# Patient Record
Sex: FEMALE | Employment: PART TIME | URBAN - METROPOLITAN AREA
[De-identification: names, ages, dates, MRNs, and addresses within clinical notes are randomized per-mention and may not be internally consistent; named-entity substitution may affect disease eponyms.]

---

## 2017-09-27 ENCOUNTER — OFFICE VISIT - HEALTHEAST (OUTPATIENT)
Dept: FAMILY MEDICINE | Facility: CLINIC | Age: 27
End: 2017-09-27

## 2017-09-27 DIAGNOSIS — Z87.898 HISTORY OF SEIZURE: ICD-10-CM

## 2017-09-27 ASSESSMENT — MIFFLIN-ST. JEOR: SCORE: 1200.35

## 2017-09-27 NOTE — ASSESSMENT & PLAN NOTE
On June 18, 2015, while she was vacationing in [facility], has seen a neurologist, MRI and EEG were all negative.  No recurrence as of September 27, 2017

## 2018-10-31 ENCOUNTER — OFFICE VISIT - HEALTHEAST (OUTPATIENT)
Dept: FAMILY MEDICINE | Facility: CLINIC | Age: 28
End: 2018-10-31

## 2018-10-31 DIAGNOSIS — Z87.898 HISTORY OF SEIZURE: ICD-10-CM

## 2018-10-31 ASSESSMENT — MIFFLIN-ST. JEOR: SCORE: 1241.17

## 2019-10-04 ENCOUNTER — COMMUNICATION - HEALTHEAST (OUTPATIENT)
Dept: FAMILY MEDICINE | Facility: CLINIC | Age: 29
End: 2019-10-04

## 2020-08-25 ENCOUNTER — COMMUNICATION - HEALTHEAST (OUTPATIENT)
Dept: FAMILY MEDICINE | Facility: CLINIC | Age: 30
End: 2020-08-25

## 2020-08-27 ENCOUNTER — OFFICE VISIT - HEALTHEAST (OUTPATIENT)
Dept: FAMILY MEDICINE | Facility: CLINIC | Age: 30
End: 2020-08-27

## 2020-08-27 DIAGNOSIS — Z87.898 HISTORY OF SEIZURE: ICD-10-CM

## 2020-10-19 ENCOUNTER — OFFICE VISIT - HEALTHEAST (OUTPATIENT)
Dept: FAMILY MEDICINE | Facility: CLINIC | Age: 30
End: 2020-10-19

## 2020-10-19 DIAGNOSIS — Z12.4 SCREENING FOR MALIGNANT NEOPLASM OF CERVIX: ICD-10-CM

## 2020-10-19 DIAGNOSIS — Z13.0 SCREENING FOR ENDOCRINE, NUTRITIONAL, METABOLIC AND IMMUNITY DISORDER: ICD-10-CM

## 2020-10-19 DIAGNOSIS — Z13.228 SCREENING FOR ENDOCRINE, NUTRITIONAL, METABOLIC AND IMMUNITY DISORDER: ICD-10-CM

## 2020-10-19 DIAGNOSIS — Z00.00 VISIT FOR PREVENTIVE HEALTH EXAMINATION: ICD-10-CM

## 2020-10-19 DIAGNOSIS — Z23 NEED FOR VACCINATION: ICD-10-CM

## 2020-10-19 DIAGNOSIS — Z13.21 SCREENING FOR ENDOCRINE, NUTRITIONAL, METABOLIC AND IMMUNITY DISORDER: ICD-10-CM

## 2020-10-19 DIAGNOSIS — Z13.29 SCREENING FOR ENDOCRINE, NUTRITIONAL, METABOLIC AND IMMUNITY DISORDER: ICD-10-CM

## 2020-10-19 LAB
ALBUMIN SERPL-MCNC: 4.3 G/DL (ref 3.5–5)
ALP SERPL-CCNC: 81 U/L (ref 45–120)
ALT SERPL W P-5'-P-CCNC: <9 U/L (ref 0–45)
ANION GAP SERPL CALCULATED.3IONS-SCNC: 9 MMOL/L (ref 5–18)
AST SERPL W P-5'-P-CCNC: 16 U/L (ref 0–40)
BASOPHILS # BLD AUTO: 0.1 THOU/UL (ref 0–0.2)
BASOPHILS NFR BLD AUTO: 1 % (ref 0–2)
BILIRUB SERPL-MCNC: 0.6 MG/DL (ref 0–1)
BUN SERPL-MCNC: 14 MG/DL (ref 8–22)
CALCIUM SERPL-MCNC: 9.3 MG/DL (ref 8.5–10.5)
CHLORIDE BLD-SCNC: 105 MMOL/L (ref 98–107)
CHOLEST SERPL-MCNC: 215 MG/DL
CO2 SERPL-SCNC: 25 MMOL/L (ref 22–31)
CREAT SERPL-MCNC: 0.66 MG/DL (ref 0.6–1.1)
EOSINOPHIL # BLD AUTO: 0.1 THOU/UL (ref 0–0.4)
EOSINOPHIL NFR BLD AUTO: 1 % (ref 0–6)
ERYTHROCYTE [DISTWIDTH] IN BLOOD BY AUTOMATED COUNT: 12.1 % (ref 11–14.5)
FASTING STATUS PATIENT QL REPORTED: NO
GFR SERPL CREATININE-BSD FRML MDRD: >60 ML/MIN/1.73M2
GLUCOSE BLD-MCNC: 74 MG/DL (ref 70–125)
HCT VFR BLD AUTO: 44.9 % (ref 35–47)
HDLC SERPL-MCNC: 62 MG/DL
HGB BLD-MCNC: 14.8 G/DL (ref 12–16)
HIV 1+2 AB+HIV1 P24 AG SERPL QL IA: NEGATIVE
LDLC SERPL CALC-MCNC: 142 MG/DL
LYMPHOCYTES # BLD AUTO: 2.4 THOU/UL (ref 0.8–4.4)
LYMPHOCYTES NFR BLD AUTO: 27 % (ref 20–40)
MAGNESIUM SERPL-MCNC: 1.9 MG/DL (ref 1.8–2.6)
MCH RBC QN AUTO: 29.7 PG (ref 27–34)
MCHC RBC AUTO-ENTMCNC: 32.9 G/DL (ref 32–36)
MCV RBC AUTO: 90 FL (ref 80–100)
MONOCYTES # BLD AUTO: 0.5 THOU/UL (ref 0–0.9)
MONOCYTES NFR BLD AUTO: 6 % (ref 2–10)
NEUTROPHILS # BLD AUTO: 5.8 THOU/UL (ref 2–7.7)
NEUTROPHILS NFR BLD AUTO: 66 % (ref 50–70)
PLATELET # BLD AUTO: 241 THOU/UL (ref 140–440)
PMV BLD AUTO: 8.6 FL (ref 7–10)
POTASSIUM BLD-SCNC: 3.9 MMOL/L (ref 3.5–5)
PROT SERPL-MCNC: 6.8 G/DL (ref 6–8)
RBC # BLD AUTO: 4.98 MILL/UL (ref 3.8–5.4)
SODIUM SERPL-SCNC: 139 MMOL/L (ref 136–145)
TRIGL SERPL-MCNC: 56 MG/DL
TSH SERPL DL<=0.005 MIU/L-ACNC: 0.74 UIU/ML (ref 0.3–5)
WBC: 8.8 THOU/UL (ref 4–11)

## 2020-10-19 ASSESSMENT — MIFFLIN-ST. JEOR: SCORE: 1234.37

## 2020-10-20 LAB
HPV SOURCE: ABNORMAL
HUMAN PAPILLOMA VIRUS 16 DNA: NEGATIVE
HUMAN PAPILLOMA VIRUS 18 DNA: NEGATIVE
HUMAN PAPILLOMA VIRUS FINAL DIAGNOSIS: ABNORMAL
HUMAN PAPILLOMA VIRUS OTHER HR: POSITIVE
SPECIMEN DESCRIPTION: ABNORMAL

## 2020-10-21 ENCOUNTER — COMMUNICATION - HEALTHEAST (OUTPATIENT)
Dept: FAMILY MEDICINE | Facility: CLINIC | Age: 30
End: 2020-10-21

## 2020-10-27 LAB
BKR LAB AP ABNORMAL BLEEDING: NO
BKR LAB AP BIRTH CONTROL/HORMONES: NORMAL
BKR LAB AP CERVICAL APPEARANCE: NORMAL
BKR LAB AP GYN ADEQUACY: NORMAL
BKR LAB AP GYN INTERPRETATION: NORMAL
BKR LAB AP HPV REFLEX: NORMAL
BKR LAB AP LMP: NORMAL
BKR LAB AP PATIENT STATUS: NO
BKR LAB AP PREVIOUS ABNORMAL: NO
BKR LAB AP PREVIOUS NORMAL: NORMAL
HIGH RISK?: NO
PATH REPORT.COMMENTS IMP SPEC: NORMAL
RESULT FLAG (HE HISTORICAL CONVERSION): NORMAL

## 2020-10-28 ENCOUNTER — COMMUNICATION - HEALTHEAST (OUTPATIENT)
Dept: FAMILY MEDICINE | Facility: CLINIC | Age: 30
End: 2020-10-28

## 2020-10-28 DIAGNOSIS — R87.810 CERVICAL HIGH RISK HPV (HUMAN PAPILLOMAVIRUS) TEST POSITIVE: ICD-10-CM

## 2021-05-31 VITALS — BODY MASS INDEX: 20.98 KG/M2 | WEIGHT: 114 LBS | HEIGHT: 62 IN

## 2021-06-02 VITALS — BODY MASS INDEX: 22.63 KG/M2 | HEIGHT: 62 IN | WEIGHT: 123 LBS

## 2021-06-05 VITALS
DIASTOLIC BLOOD PRESSURE: 66 MMHG | WEIGHT: 118 LBS | BODY MASS INDEX: 20.91 KG/M2 | HEIGHT: 63 IN | TEMPERATURE: 98.3 F | RESPIRATION RATE: 20 BRPM | SYSTOLIC BLOOD PRESSURE: 102 MMHG | HEART RATE: 88 BPM

## 2021-06-10 NOTE — PROGRESS NOTES
"Tonny Beauchamp is a 29 y.o. female who is being evaluated via a billable video visit.      The patient has been notified of following:     \"This video visit will be conducted via a call between you and your physician/provider. We have found that certain health care needs can be provided without the need for an in-person physical exam.  This service lets us provide the care you need with a video conversation.  If a prescription is necessary we can send it directly to your pharmacy.  If lab work is needed we can place an order for that and you can then stop by our lab to have the test done at a later time.    Video visits are billed at different rates depending on your insurance coverage. Please reach out to your insurance provider with any questions.    If during the course of the call the physician/provider feels a video visit is not appropriate, you will not be charged for this service.\"    Patient has given verbal consent to a Video visit? Yes  How would you like to obtain your AVS? AVS Preference: MyChart.  If dropped by the video visit, the video invitation should be sent to: Send to e-mail at: [email or web address]  Will anyone else be joining your video visit? No        Video Start Time: 01:45pm    Additional provider notes:She has a history of single episode of seizure on June 18, 2015  while she was vacationing in [facility], has seen a neurologist here for a follow up, MRI and EEG were all negative.  No recurrence since then, no headaches or any new symptoms, she needed her [place] [employer] form completed stating that she is able to drive.  Has been driving without difficulty.  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     GENERAL: Healthy, alert and no distress  EYES: Eyes grossly normal to inspection. No discharge or erythema, or obvious scleral/conjunctival abnormalities.  RESP: No audible " "wheeze, cough, or visible cyanosis.  No visible retractions or increased work of breathing.    NEURO: Cranial nerves grossly intact. Mentation and speech appropriate for age.  PSYCH: Mentation appears normal, affect normal/bright, judgement and insight intact, normal speech and appearance well-groomed  Tonny was seen today for paperwork.    Diagnoses and all orders for this visit:    History of seizure    She has a history of \"seizure\" about 5 years ago, she did follow with a neurologist, full work-up were negative, no recurrence since then, we did go over the questionnaire from the [place] [employer] public [employer], form was completed today a copy was sent to be scanned in her record, return in 2 years  sooner if there is any new episode.    Video-Visit Details    Type of service:  Video Visit    Video End Time (time video stopped): 01:57pm  Originating Location (pt. Location): Home    Distant Location (provider location):  [facility] FAMILY MEDICINE/OB     Platform used for Video Visit: Lucio Ibrahim MD    "

## 2021-06-10 NOTE — TELEPHONE ENCOUNTER
She can schedule a virtual appointment ( video preferably)to help complete the form and schedule a physical appointment later on.

## 2021-06-12 NOTE — PROGRESS NOTES
FEMALE ADULT PREVENTIVE EXAM    CHIEF COMPLAINT:  Female preventive exam.    SUBJECTIVE:  Tonny Beauchamp is a 29 y.o. female who presents for her routine physical exam.    Patient would like to address the following concerns today: Occasional lower abdominal pain, occasional constipation, occasional heart palpitation.  No shortness of breath no dizziness, no nausea or vomiting.  History of seizures from 5 years ago, work-up at the neurology office has been negative, has been feeling a DMV form every 2 years, could possibly move to every 4 years if she remains seizure-free.      GYNE HISTORY  Menses: yes  Sexually Active: na  Contraception: no  Last Pap: 2018/ in [place], no result available  Abnormal Pap: no  Vaginal Discharge: no      She  has no past medical history on file.    Lab Results   Component Value Date    WBC 8.8 10/19/2020    HGB 14.8 10/19/2020    HCT 44.9 10/19/2020    MCV 90 10/19/2020     10/19/2020     10/19/2020    K 3.9 10/19/2020    BUN 14 10/19/2020     Lab Results   Component Value Date    CHOL 215 (H) 10/19/2020    HDL 62 10/19/2020    LDLCALC 142 (H) 10/19/2020    TRIG 56 10/19/2020     Lab Results   Component Value Date    TSH 0.74 10/19/2020     BP Readings from Last 3 Encounters:   10/19/20 102/66   10/31/18 106/60   09/27/17 104/60       Surgeries:  No past surgical history on file.    Family History:  Her family history is not on file.    Social History:  She  reports that she has never smoked. She has never used smokeless tobacco. She reports current alcohol use.    Medications:    Current Outpatient Medications:      ascorbic acid (VITAMIN C ORAL), Take by mouth., Disp: , Rfl:      ergocalciferol (VITAMIN D2) 1,250 mcg (50,000 unit) capsule, Take 50,000 Units by mouth every 7 days., Disp: , Rfl:      MAGNESIUM ORAL, Take by mouth., Disp: , Rfl:      ZINC ORAL, Take by mouth., Disp: , Rfl:   HELD MEDICATIONS: None.    Allergies:  No latex allergies.  No Known  Allergies         RISK BEHAVIOR & HEALTH HABITS  Self Breast Exam: yes.  Regular Exercise: yes.  Balanced diet: yes.  Seat Belt Use: yes  Calcium intake/Osteoporosis prevention: yes.    Guns: NA  Sun Screen: YES  Dental Care: YES    REVIEW OF SYSTEMS:  Complete head to toe review of systems is otherwise negative except as above.    OBJECTIVE:  VITAL SIGNS:    Vitals:    10/19/20 1518   BP: 102/66   Pulse: 88   Resp: 20   Temp: 98.3  F (36.8  C)     GENERAL:  Patient alert, in no acute distress.  EYES: PERRLA. Extraoccular movements intact, pupils equal, reactive to light and accommodation.  Normal conjunctiva and lids.    ENT:  Hearing grossly normal.  Normal appearance to ears and nose.  Bilateral TM s, external canals, oropharynx normal. Normal lips, gums and teeth.    NECK:  Supple, without thyromegaly or mass, no adenopathies.  RESP:  Clear to auscultation without crackles, wheezes or distress.  Normal respiratory effort.   CV:  Regular rate and rhythm without murmurs, rubs or gallops.  Normal pedal pulses.  No varicosities or edema.  ABDOMEN:  Soft, non-tender, without hepatosplenomegaly, masses, or hernias.   BREASTS:  declined    :  (chaperoned by Alberto GN ,female CMA)  External genitalia:  Normal without lesions.  Urethra: Normal appearing  Vagina: Normal without discharge  Cervix: normal, mild bleeding post Pap smear.  Rectal: No visible external lesions  LYMPHATIC: Normal palpation of neck.  No lymphadenopathy.  No bruising.  NEURO:  CN II-XII intact, motor & sensory function all intact.  DTR and reflexes normal.  PSYCHIATRIC:  Alert & oriented with normal mood and affect.  Good judgment and insight.  SKIN:  Normal inspection and palpation.  MUSCULOSKELETAL: Normal gait and station.  - Spine / Ribs / Pelvis: Normal inspection, ROM, stability and strength: Spine, Head, Neck, Upper and Lower Extremities.    ASSESSMENT & PLAN  Tonny was seen today for annual exam, abdominal pain, immunizations and  palpitations.    Diagnoses and all orders for this visit:    Visit for preventive health examination    Need for vaccination  -     Td, Preservative Free (green label)    Screening for malignant neoplasm of cervix  -     Gynecologic Cytology (PAP Smear)    Screening for endocrine, nutritional, metabolic and immunity disorder  -     HM1(CBC and Differential)  -     Comprehensive Metabolic Panel  -     HIV Antigen/Antibody Screening Cascade  -     Lipid Cascade  -     Thyroid Cascade  -     Magnesium    Other orders  -     Influenza, Seasonal Quad, PF =/> 6months     History of seizures from 5 years ago, work-up at the neurology office has been negative, has been feeling a DMV form every 2 years, could possibly move to every 4 years if she remains seizure-free.  General  Immunizations reviewed and updated .  Alcohol use, safety and moderation discussed.  Recommended adequate calcium, vitamin D intake/osteoporosis prevention.  Discussed colon cancer screening at age 50, 45 if -American.  Diet and exercise reviewed, including goal of aerobic exercise 30-90 minutes most days of the week, moderation of portions sizes, avoiding eating out and fast food and increase in fruits and vegetables.  Discussed safe sex practices.    Discussed & recommended seat belt (& motorcycle helmet) use.  Discussed & recommended smoke detector.  Discussed sun protection.  Discussed weight management.  Discussed self breast exam, screening mammogram timing.  I have had an Advance Directives discussion with the patient.  The following high BMI interventions were performed this visit: encouragement to exercise    Yuval Ibrahim MD

## 2021-06-13 NOTE — PROGRESS NOTES
"OFFICE VISIT - FAMILY MEDICINE     ASSESSMENT AND PLAN     1. History of seizure     No recurrence since 2015, DMV form was completed today, recheck in 1 year, sooner if there is any new episode, make an appointment for general physical exam at her earliest convenience. She is due for screening Pap smear, HPV, Tdap, influenza vaccine and possibly polio, varicella and hepatitis A vaccine.    CHIEF COMPLAINT   Form (DMV, SEIZURE   - VAND-0)    HPI   Tonny Beauchamp is a 26 y.o. female.  Updated MIIC - Needs PAP, Not sure where last PAP was  History of single episode of seizure on June 18, 2015, while she was vacationing in [facility], has seen a neurologist here, MRI and EEG were all negative.  No recurrence as of September 27, 2017    History of seizure on June 2015 while she was in [facility] for the  soccer cup, [facility], MRI and EEG were all negative, patient about starting a treatment with single episode of seizure was offered, but she elected not to do so, no recurrence since then.  Works as a , and she also goes to school at [employer],  She is originally from [facility], does not smoke cigarettes, very rarely drink alcohol.  No illicit drug use.  She lives with her mom and sister, sister does have a history of febrile seizures at a young age.    Review of Systems As per HPI, otherwise negative.    OBJECTIVE   /60 (Patient Site: Right Arm)  Pulse 64  Temp 98.3  F (36.8  C) (Oral)   Resp 13  Ht 5' 2\" (1.575 m)  Wt 114 lb (51.7 kg)  LMP 09/19/2017 (Approximate)  BMI 20.85 kg/m2  Physical Exam   Constitutional: She is oriented to person, place, and time. She appears well-developed and well-nourished.   Neurological: She is alert and oriented to person, place, and time. Coordination normal.   Psychiatric: She has a normal mood and affect. Her behavior is normal. Judgment and thought content normal.       PFSH   No family history on " file.  Social History     Social History     Marital status: Single     Spouse name: N/A     Number of children: N/A     Years of education: N/A     Occupational History     Not on file.     Social History Main Topics     Smoking status: Never Smoker     Smokeless tobacco: Never Used     Alcohol use Yes      Comment: Occasionally     Drug use: Not on file     Sexual activity: Not on file     Other Topics Concern     Not on file     Social History Narrative     Relevant history was reviewed with the patient today, unless noted in HPI nothing is pertinent for this visit.  [facility]     Patient Active Problem List    Diagnosis Date Noted     History of seizure 09/27/2017     Assessment & Plan Note:     On June 18, 2015, while she was vacationing in [facility], has seen a neurologist, MRI and EEG were all negative.  No recurrence as of September 27, 2017       Dizziness      Overview Note:     Created by Conversion         Chest Pain      Overview Note:     Created by Conversion         Allergy To Pollens Trees      Overview Note:     Created by Conversion  [facility] Annotation: Oct 23 2013  8:10PM - Pj Savage: Asha         No past surgical history on file.    RESULTS/CONSULTS (Lab/Rad)   No results found for this or any previous visit (from the past 168 hour(s)).  No results found.  MEDICATIONS     Current Outpatient Prescriptions on File Prior to Visit   Medication Sig Dispense Refill     [DISCONTINUED] levonorgestrel-ethinyl estradiol (AVIANE,ALESSE,LESSINA) 0.1-20 mg-mcg per tablet Take 1 tablet by mouth daily.       No current facility-administered medications on file prior to visit.        HEALTH MAINTENANCE / SCREENING   PHQ-2 Total Score: 1 (9/27/2017  8:55 AM), No Data Recorded,No Data Recorded  Immunization History   Administered Date(s) Administered     Hep A, historic 10/29/2007     Hep B, historic 08/17/2005, 12/20/2005, 03/23/2006     IPV 08/17/2005, 12/20/2005     Influenza, inj, historic 10/04/2012      Meningococcal MPSV4 10/29/2007     Td, historic 08/17/2005, 12/20/2005     Tdap 03/23/2006, 08/15/2006     Varicella 03/23/2006     Health Maintenance   Topic     HPV VACCINES (1 of 3 - Female 3 Dose Series)     ADVANCE DIRECTIVES DISCUSSED WITH PATIENT      PAP SMEAR      TDAP ADULT ONE TIME DOSE      INFLUENZA VACCINE RULE BASED (1)     TD 18+ HE        Yuval Ibrahim MD  Family Medicine, [facility], [place]   This transcription uses voice recognition software, which may contain typographical errors.

## 2021-06-16 PROBLEM — Z87.898 HISTORY OF SEIZURE: Status: ACTIVE | Noted: 2017-09-27

## 2021-06-16 PROBLEM — R87.810 CERVICAL HIGH RISK HPV (HUMAN PAPILLOMAVIRUS) TEST POSITIVE: Status: ACTIVE | Noted: 2020-10-19

## 2021-06-18 NOTE — PATIENT INSTRUCTIONS - HE
Patient Instructions by Yuval Ibrahim MD at 10/19/2020  3:20 PM     Author: Yuval Ibrahim MD Service: -- Author Type: Physician    Filed: 10/20/2020  7:56 AM Encounter Date: 10/19/2020 Status: Signed    : Yuval Ibrahim MD (Physician)           Patient Education     Prevention Guidelines, Women Ages 18 to 39  Screening tests and vaccines are an important part of managing your health. A screening test is done to find possible disorders or diseases in people who don't have any symptoms. The goal is to find a disease early so lifestyle changes can be made and you can be watched more closely to reduce the risk of disease, or to detect it early enough to treat it most effectively. Screening tests are not considered diagnostic, but are used to determine if more testing is needed. Health counseling is essential, too. Below are guidelines for these, for women ages 18 to 39. Talk with your healthcare provider to make sure youre up-to-date on what you need.  Screening Who needs it How often   Alcohol misuse All women in this age group At routine exams   Blood pressure All women in this age group Yearly checkup if your blood pressure is normal  Normal blood pressure is less than 120/80 mm Hg  If your blood pressure reading is higher than normal, follow the advice of your healthcare provider   Breast cancer All women in this age group should talk with their healthcare providers about the need for clinical breast exams (CBE)1 Clinical breast exam every 3 years1   Cervical cancer Women ages 21 and older Women between ages 21 and 29 should have a Pap test every 3 years; women between ages 30 and 65 are advised to have a Pap test plus an HPV test every 5 years   Chlamydia Sexually active women ages 25 and younger, and women at increased risk for infection (such as having multiple sex partners) Every year if you're at risk or have symptoms   Depression All women in this age group At routine  exams   Type 2 diabetes, prediabetes All women with no symptoms who are overweight or obese and have 1 or more other risk factors for diabetes At least every 3 years. Also, testing for diabetes during pregnancy after the 24th week.    Type 2 diabetes, prediabetes All women diagnosed with gestational diabetes Lifelong testing every 3 years   Type 2 diabetes All women with prediabetes Every year   Gonorrhea Sexually active women at increased risk for infection At routine exams   Hepatitis C Anyone at increased risk At routine exams   HIV All women should be tested at least once for HIV between the ages of 13 and 64 At routine exams. Those with risk factors for HIV should be tested at least annually.    Obesity All women in this age group At routine exams   Syphilis Women at increased risk for infection should talk with their healthcare provider At routine exams   Tuberculosis Women at increased risk for infection should talk with their healthcare provider Ask your healthcare provider   Vision All women in this age group At least 1 complete exam in your 20s, and 2 in your 30s   Vaccine2 Who needs it How often   Chickenpox (varicella) All women in this age group who have no record of this infection or vaccine 2 doses; the second dose should be given 4 to 8 weeks after the first dose   Hepatitis A Women at increased risk for infection should talk with their healthcare provider 2 doses given at least 6 months apart   Hepatitis B Women at increased risk for infection should talk with their healthcare provider 3 doses over 6 months; second dose should be given 1 month after the first dose; the third dose should be given at least 2 months after the second dose and at least 4 months after the first dose   Haemophilus influenzae Type B (HIB) Women at increased risk for infection should talk with their healthcare provider 1 to 3 doses   Human papillomavirus (HPV) All women in this age group up to age 26 3 doses; the second dose  should be given 1 to 2 months after the first dose and the third dose given 6 months after the first dose   Influenza (flu) All women in this age group Once a year   Measles, mumps, rubella (MMR) All women in this age group who have no record of these infections or vaccines 1 or 2 doses   Meningococcal Women at increased risk for infection should talk with their healthcare provider 1 or more doses   Pneumococcal conjugate vaccine (PCV13) and pneumococcal polysaccharide vaccine (PPSV23) Women at increased risk for infection should talk with their healthcare provider PCV13: 1 dose ages 19 to 65 (protects against 13 types of pneumococcal bacteria)  PPSV23: 1 to 2 doses through age 64, or 1 dose at 65 or older (protects against 23 types of pneumococcal bacteria)      Tetanus/diphtheria/pertussis (Td/Tdap) booster All women in this age group Td every 10 years, or a one-time dose of Tdap instead of a Td booster after age 18, then Td every 10 years   Counseling Who needs it How often   BRCA gene mutation testing for breast and ovarian cancer susceptibility Women with increased risk for having gene mutation When your risk is known   Breast cancer and chemoprevention Women at high risk for breast cancer When your risk is known   Diet and exercise Women who are overweight or obese When diagnosed, and then at routine exams   Domestic violence Women at the age in which they are able to have children At routine exams   Sexually transmitted infection prevention Women who are sexually active At routine exams   Skin cancer Prevention of skin cancer in fair-skinned adults At routine exams   Use of tobacco and the health effects it can cause All women in this age group Every visit   1 According to the ACS, women ages 20 to 39 years should have a clinical breast exam (CBE) as part of their routine health exam every 3 years. Breast self-exams are an option for women starting in their 20s. But the USPSTF does not recommend CBE.  Date  Last Reviewed: 10/1/2017    8825-9483 [employer], [employer]. [address] [place] [address], [place] [address]. All rights reserved. This information is not intended as a substitute for professional medical care. Always follow your healthcare professional's instructions.

## 2021-06-19 NOTE — LETTER
Letter by Yuval Ibrahim MD at      Author: Yuval Ibrahim MD Service: -- Author Type: --    Filed:  Encounter Date: 10/4/2019 Status: Signed       Tonny IJEOMA Hudsonbruno Beauchamp  [address] [place] [address]    October 4, 2019    Dear Tonny    In reviewing your records, we have determined a gap in your preventive services. Based on your age and health history, we recommend the follow service.     ? General Physical  ? Physical with a Pap Smear  ? Colon cancer screening  ? Mammogram  ? Immunization  ? Diabetic check  ? Blood pressure/cardiovascular check  ? Asthma check  ? Cholesterol test  ? Lab work  ? Med check    If you have had the service elsewhere, please contact us so we can update our records. Please let us know if you have transferred your care to another clinic.    Please call [phone] to schedule this appointment.    We believe that a strong preventive care program, including regular physicals and follow-up care is an important part of a healthy lifestyle and we are committed to helping you maintain your health.    Thank you for choosing us as your health care provider.    Sincerely,   [facility] Family Medicine/OB  [address] [facility] [address]  Phone Number:  [phone]

## 2021-06-21 NOTE — PROGRESS NOTES
"OFFICE VISIT - FAMILY MEDICINE     ASSESSMENT AND PLAN     1. History of seizure     History of seizure in 2015, but no recurrence since then,  form was completed today, she will let us know if she has any new issues in between, she will make an effort to forward her recent Pap smear done in [facility] to us to be scanned.    CHIEF COMPLAINT   Paperwork (DMV); Dizziness; and Concerns (DRY MOUTH)    HPI   Tonny Beauchamp is a 27 y.o. female.  History of single episode of seizure on June 18, 2015, while she was vacationing in [facility], has seen a neurologist here, MRI and EEG were all negative.  No recurrence as of September 27, 2017  First and last episode of seizure was June 18, 2015, workup as the neurology office has been negative, she was offered option of just doing nothing of Keppra, she elected to do nothing, has not had any recurrent since then, [place] Department of health would like her to complete a  assessment every year in order to renew her  license.  Has not had any new episode of seizure, has been doing overall fine, occasionally gets some dry mouth from allergies but nothing more than that.  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  She stated that she had a screening Pap smear done in [place] last year, result was negative, she will make an effort to forward the results to us.  Review of Systems As per HPI, otherwise negative.    OBJECTIVE   /60 (Patient Site: Right Arm)  Pulse 60  Temp 98.3  F (36.8  C) (Oral)   Resp 12  Ht 5' 2\" (1.575 m)  Wt 123 lb (55.8 kg)  LMP 10/10/2018 (Approximate)  BMI 22.5 kg/m2  Physical Exam   Constitutional: She is oriented to person, place, and time. She appears well-developed and well-nourished.   HENT:   Head: Normocephalic and atraumatic.   Neck: Normal range of motion. Neck supple. No JVD present. No tracheal deviation present. No thyromegaly present.   Cardiovascular: Normal rate, regular rhythm, " normal heart sounds and intact distal pulses.  Exam reveals no gallop and no friction rub.    No murmur heard.  Pulmonary/Chest: Effort normal and breath sounds normal. No respiratory distress. She has no wheezes. She has no rales.   Musculoskeletal: She exhibits no edema or tenderness.   Lymphadenopathy:     She has no cervical adenopathy.   Neurological: She is alert and oriented to person, place, and time. Coordination normal.   Psychiatric: She has a normal mood and affect. Judgment and thought content normal.       [facility]   No family history on file.  Social History     Social History     Marital status: Single     Spouse name: N/A     Number of children: N/A     Years of education: N/A     Occupational History     Not on file.     Social History Main Topics     Smoking status: Never Smoker     Smokeless tobacco: Never Used     Alcohol use Yes      Comment: Occasionally     Drug use: Not on file     Sexual activity: Not on file     Other Topics Concern     Not on file     Social History Narrative     Relevant history was reviewed with the patient today, unless noted in HPI, nothing is pertinent for this visit.  [facility]     Patient Active Problem List    Diagnosis Date Noted     History of seizure 09/27/2017     Allergy To Pollens Trees      Overview Note:     Created by [facility] Annotation: Oct 23 2013  8:10PM - Sue Savage         No past surgical history on file.    RESULTS/CONSULTS (Lab/Rad)   No results found for this or any previous visit (from the past 168 hour(s)).  No results found.  MEDICATIONS     No current outpatient prescriptions on file prior to visit.     No current facility-administered medications on file prior to visit.        HEALTH MAINTENANCE / SCREENING   No Data Recorded, No Data Recorded,No Data Recorded  Immunization History   Administered Date(s) Administered     Hep A, historic 10/29/2007     Hep B, Peds or Adolescent 08/17/2005, 12/20/2005, 03/23/2006     Hep B,  historic 08/17/2005, 12/20/2005, 03/23/2006     Hepatitis A, Ped/Adol 2 Dose IM (18yr & under) 10/29/2007     IPV 08/17/2005, 12/20/2005     Influenza, inj, historic,unspecified 10/04/2012     Meningococcal MCV4P 10/29/2007     Meningococcal MPSV4, SQ 10/29/2007     Td, Adult, Absorbed 08/17/2005     Td, adult adsorbed, PF 12/20/2005     Td,adult,historic,unspecified 08/17/2005, 12/20/2005     Tdap 03/23/2006, 08/15/2006     Varicella 03/23/2006     Health Maintenance   Topic     HPV VACCINES (1 of 3 - Female 3 Dose Series)     ADVANCE DIRECTIVES DISCUSSED WITH PATIENT      PAP SMEAR      TD 18+ HE      TDAP ADULT ONE TIME DOSE      INFLUENZA VACCINE RULE BASED (1)       Yuval Ibrahim MD  Family Medicine, [facility]     This note was dictated using a voice recognition software.  Any grammatical or context distortion are unintentional and inherent to the software.

## 2021-10-03 ENCOUNTER — HEALTH MAINTENANCE LETTER (OUTPATIENT)
Age: 31
End: 2021-10-03

## 2021-10-04 ENCOUNTER — PATIENT OUTREACH (OUTPATIENT)
Dept: FAMILY MEDICINE | Facility: CLINIC | Age: 31
End: 2021-10-04
Payer: COMMERCIAL

## 2021-10-04 DIAGNOSIS — R87.810 CERVICAL HIGH RISK HPV (HUMAN PAPILLOMAVIRUS) TEST POSITIVE: ICD-10-CM

## 2021-10-04 NOTE — LETTER
November 4, 2021      Tonny Beauchamp  [address] [place] [address]        Dear Ms.Fonseca Beauchamp,    This letter is to remind you that you are due for your follow-up Pap smear and Human Papillomavirus (HPV) test.    Please call [phone] to schedule your appointment at your earliest convenience.    If you have completed the appointment outside of the [facility] system, please have the records forwarded to our office. We will update your chart for your provider to review before your next annual wellness visit.     Thank you for choosing [facility]!      Sincerely,    Your [facility] Care Team

## 2021-10-04 NOTE — LETTER
"December 6, 2021      Tonny Beauchamp  [address] [place] [address]        Dear Ms.Fonseca Beauchamp,    Pap Hx:  10/19/20 NIL pap, +HR HPV (not 16/18) @ 30 yo. Plan: cotest in 1 year  10/4/21 Reminder letter  11/3/21 Reminder call -- \"your call cannot be completed at this time\"  11/4/21 Reminder call -- \"your call cannot be completed at this time\" Final reminder letter sent  12/3/21 Lost to follow-up for pap tracking, fyi routed to provider       Please call the clinic to schedule an appointment to follow up       Sincerely,        [facility]                                "

## 2021-10-04 NOTE — LETTER
October 4, 2021      Tonny Beauchamp  [address] [place] [address]        Dear Ms.Fonseca Beauchamp,    This letter is to remind you that you are due for your follow-up Pap smear and Human Papillomavirus (HPV) test.    Please call [phone] to schedule your appointment at your earliest convenience.    If you have completed the appointment outside of the [facility] system, please have the records forwarded to our office. We will update your chart for your provider to review before your next annual wellness visit.     Thank you for choosing [facility]!      Sincerely,    Your [facility] Care Team

## 2021-11-03 NOTE — TELEPHONE ENCOUNTER
"Patient due for Pap and HPV.    Reminder done today via telephone call.    Attempted to call. Voice states \"your call cannot be completed at this time\"  "

## 2021-11-04 NOTE — TELEPHONE ENCOUNTER
"Patient due for Pap and HPV.    Reminder done today via telephone call.    \"Your call cannot be completed at this time\". Final reminder letter sent.  "

## 2021-11-28 ENCOUNTER — HEALTH MAINTENANCE LETTER (OUTPATIENT)
Age: 31
End: 2021-11-28

## 2021-12-03 NOTE — TELEPHONE ENCOUNTER
"Hi Dr. Ibrahim,   Patient is lost to pap tracking follow-up. Attempts to contact pt have been made per reminder process and there has been no reply and/or no appt scheduled.       Pap Hx:  10/19/20 NIL pap, +HR HPV (not 16/18) @ 28 yo. Plan: cotest in 1 year  10/4/21 Reminder letter  11/3/21 Reminder call -- \"your call cannot be completed at this time\"  11/4/21 Reminder call -- \"your call cannot be completed at this time\" Final reminder letter sent  12/3/21 Lost to follow-up for pap tracking, fyi routed to provider    "

## 2022-09-10 ENCOUNTER — HEALTH MAINTENANCE LETTER (OUTPATIENT)
Age: 32
End: 2022-09-10

## 2023-01-22 ENCOUNTER — HEALTH MAINTENANCE LETTER (OUTPATIENT)
Age: 33
End: 2023-01-22